# Patient Record
Sex: MALE | Race: WHITE | URBAN - METROPOLITAN AREA
[De-identification: names, ages, dates, MRNs, and addresses within clinical notes are randomized per-mention and may not be internally consistent; named-entity substitution may affect disease eponyms.]

---

## 2018-09-07 LAB — RAPID GROUP A STREP (OHS): POSITIVE

## 2019-01-23 LAB
INFLUENZA A ANTIGEN, POC: NEGATIVE
INFLUENZA B ANTIGEN, POC: NEGATIVE
RAPID GROUP A STREP (OHS): NEGATIVE

## 2019-02-21 ENCOUNTER — HISTORICAL (OUTPATIENT)
Dept: ADMINISTRATIVE | Facility: HOSPITAL | Age: 24
End: 2019-02-21

## 2022-04-10 ENCOUNTER — HISTORICAL (OUTPATIENT)
Dept: ADMINISTRATIVE | Facility: HOSPITAL | Age: 27
End: 2022-04-10

## 2022-04-26 VITALS
HEIGHT: 72 IN | SYSTOLIC BLOOD PRESSURE: 129 MMHG | OXYGEN SATURATION: 98 % | DIASTOLIC BLOOD PRESSURE: 85 MMHG | BODY MASS INDEX: 42.66 KG/M2 | WEIGHT: 315 LBS

## 2022-05-02 NOTE — HISTORICAL [facility] CERNER
This is a historical note converted from Dalia. Formatting and pictures may have been removed.  Please reference Dalia for original formatting and attached multimedia. Chief Complaint  2 weeks ago diagnosed with flu, cough still here. Tuesday PCP prescribed cough meds, steroid injection and abx injection. No relief.  History of Present Illness  See previous office visit.? Patient had a flulike illness?a few weeks ago.? States he was?mildly improving but still had a cough when he began to?return of?fever and chills on Tuesday.? Patient states he was seen by his PCP who gave?an unknown steroid injection and antibiotic injection,?codeine-based cough medication and amoxicillin.? Patient states that?he has not had any improvement in his cough.  Review of Systems  Constitutional_fever, fatigue, body aches  ENMT_, congestion  Respiratory_cough, mucus production  Cardiovascular_no chest pain, no palpitations, no edema  Gastrointestinal_no nausea, vomiting, or diarrhea. No abdominal pain  Musculoskeletal_no joint swelling  Integumentary_no skin rash or abnormal lesion  Neurologic_no dizziness, no weakness or numbness  ?  ?  Physical Exam  Vitals & Measurements  T:?38.2? ?C (Oral)? HR:?102(Peripheral)? BP:?137/89?  HT:?183?cm? WT:?155?kg? BMI:?46.28?  Assessment/Plan  1.?Fever?R50.9  Ibuprofen or Tylenol for fever/pain as needed  Increase fluid intake  ?  Contact this clinic for any further problems  ?  Ordered:  Office/Outpatient Visit Level 4 Established [id number] PC, Cough  Fever, [facility], 02/21/19 19:02:00 CST  ?  Cough?R05  Ordered:  Office/Outpatient Visit Level 4 Established [id number] PC, Cough  Fever, [facility], 02/21/19 19:02:00 CST  ?  Orders:  chlophedianol-pyrilamine, 10 mL =, Oral, q8hr, PRN PRN as needed for cold symptoms, not to exceed 3 doses/day, X 7 day(s), # 6 oz, 0 Refill(s), Pharmacy: [id number]  doxycycline, 100 mg = 1 cap(s), Oral, BID, X 10 day(s), # 20 cap(s), 0 Refill(s), [facility]  [facility] [id number]   Problem List/Past Medical History  Ongoing  Morbid obesity  Historical  No qualifying data  Procedure/Surgical History  Right shoulder (08/07/2015)  Ankle (05/01/2012)   Medications  Amoxil 875 mg oral tablet, 875 mg= 1 tab(s), Oral, BID  codeine-promethazine 10 mg-6.25 mg/5 mL oral syrup, 5 mL, Oral, QID  doxycycline monohydrate 100 mg oral capsule, 100 mg= 1 cap(s), Oral, BID  Ninjacof oral liquid, 10 mL, Oral, q8hr, PRN  Allergies  No Known Medication Allergies  Social History  Alcohol  Current, Beer, 1-2 times per week, 01/23/2019  Substance Abuse  Never, 01/23/2019  Tobacco  Never (less than 100 in lifetime), No, 02/21/2019  Family History  Family history is negative  Health Maintenance  Health Maintenance  ???Pending?(in the next year)  ??? ??Due?  ??? ? ? ?ADL Screening due??02/21/19??and every 1??year(s)  ??? ? ? ?Alcohol Misuse Screening due??02/21/19??and every 1??year(s)  ??? ? ? ?Depression Screening due??02/21/19??and every?  ??? ? ? ?Tetanus Vaccine due??02/21/19??and every 10??year(s)  ??? ??Due In Future?  ??? ? ? ?Smoking Cessation not due until??09/07/19??and every 1??year(s)  ???Satisfied?(in the past 1 year)  ??? ??Satisfied?  ??? ? ? ?Blood Pressure Screening on??02/21/19.??Satisfied by MING PATTERSON  ??? ? ? ?Body Mass Index Check on??02/21/19.??Satisfied by MING PATTERSON  ??? ? ? ?Influenza Vaccine on??02/21/19.??Satisfied by MING PATTERSON  ??? ? ? ?Obesity Screening on??02/21/19.??Satisfied by MING PATTERSON  ??? ? ? ?Smoking Cessation on??09/07/18.??Satisfied by Domenica Flores LPN  ?  ?  Diagnostic Results  cxr- awaiting over read

## 2022-09-16 ENCOUNTER — HISTORICAL (OUTPATIENT)
Dept: ADMINISTRATIVE | Facility: HOSPITAL | Age: 27
End: 2022-09-16